# Patient Record
Sex: FEMALE | Race: AMERICAN INDIAN OR ALASKA NATIVE
[De-identification: names, ages, dates, MRNs, and addresses within clinical notes are randomized per-mention and may not be internally consistent; named-entity substitution may affect disease eponyms.]

---

## 2020-07-05 ENCOUNTER — HOSPITAL ENCOUNTER (INPATIENT)
Dept: HOSPITAL 5 - LD | Age: 25
LOS: 2 days | Discharge: HOME | End: 2020-07-07
Attending: OBSTETRICS & GYNECOLOGY | Admitting: OBSTETRICS & GYNECOLOGY
Payer: MEDICAID

## 2020-07-05 DIAGNOSIS — Z3A.39: ICD-10-CM

## 2020-07-05 DIAGNOSIS — Z23: ICD-10-CM

## 2020-07-05 LAB
HCT VFR BLD CALC: 32.7 % (ref 30.3–42.9)
HGB BLD-MCNC: 10.8 GM/DL (ref 10.1–14.3)
MCHC RBC AUTO-ENTMCNC: 33 % (ref 30–34)
MCV RBC AUTO: 87 FL (ref 79–97)
PLATELET # BLD: 165 K/MM3 (ref 140–440)
RBC # BLD AUTO: 3.79 M/MM3 (ref 3.65–5.03)

## 2020-07-05 PROCEDURE — 86592 SYPHILIS TEST NON-TREP QUAL: CPT

## 2020-07-05 PROCEDURE — 86706 HEP B SURFACE ANTIBODY: CPT

## 2020-07-05 PROCEDURE — 85018 HEMOGLOBIN: CPT

## 2020-07-05 PROCEDURE — 36415 COLL VENOUS BLD VENIPUNCTURE: CPT

## 2020-07-05 PROCEDURE — 85027 COMPLETE CBC AUTOMATED: CPT

## 2020-07-05 PROCEDURE — 86762 RUBELLA ANTIBODY: CPT

## 2020-07-05 PROCEDURE — 86901 BLOOD TYPING SEROLOGIC RH(D): CPT

## 2020-07-05 PROCEDURE — 86900 BLOOD TYPING SEROLOGIC ABO: CPT

## 2020-07-05 PROCEDURE — 86850 RBC ANTIBODY SCREEN: CPT

## 2020-07-05 PROCEDURE — 86803 HEPATITIS C AB TEST: CPT

## 2020-07-05 PROCEDURE — 85014 HEMATOCRIT: CPT

## 2020-07-05 RX ADMIN — DOCUSATE SODIUM SCH MG: 100 CAPSULE, LIQUID FILLED ORAL at 23:15

## 2020-07-05 RX ADMIN — IBUPROFEN SCH MG: 800 TABLET, FILM COATED ORAL at 19:34

## 2020-07-05 RX ADMIN — IBUPROFEN SCH MG: 800 TABLET, FILM COATED ORAL at 10:57

## 2020-07-05 RX ADMIN — DOCUSATE SODIUM SCH MG: 100 CAPSULE, LIQUID FILLED ORAL at 13:36

## 2020-07-05 NOTE — PROCEDURE NOTE
OB Delivery Note





- Delivery


Date of Delivery: 20 (Home delivery @ 0820 of female infant)


Assistant: INGRIS CHAPPELL


Estimated blood loss: 100cc





- Vaginal


Delivery presentation: vertex


Delivery position: OA


Intrapartum events: precipitous labor- <3hr


Delivery induction: none


Delivery monitor: none


Delivery cord: nuchal cord (X2 easily reduced by ), 3 umbilical vessels


Delivery laceration: 1st degree (Hemostatic. No bleeding noted. No repair 

needed. )


Anesthesia: none


Delivery comments: 


Pt was a  of viable female infant in the ambulance while on way to hospital.

Per EMS, pt delivered @ 0820 and placenta delivered @ 0835. Upon arrival to L&D,

vaginal and perineum inspect. First degree laceration noted. Homeostatic, not 

bleeding, no repair needed. Fundus firm, minimal bleeding. Apgars unknown. 

Weight 7-2. EBL 100ml. Mother and infant in stable condition in care of SHANT 

nurse and labor and delivery RN.

## 2020-07-05 NOTE — HISTORY AND PHYSICAL REPORT
History of Present Illness


Date of examination: 20 (Pt delivered in ambulance on way to hospital)


Date of admission: 


20 09:26





Chief complaint: 


Home delivery





History of present illness: 











EDC Confirmation:  2020


Gestational Age:  7 5/7 weeks





Past Pregnancy History 


   :      2


   Term Births:      1


   Premature Births:   0


   Living Children:   1


   Para:         1


   Mult. Births:      0


   Prev :   0


   Aborta:      0


   Elect. Ab:      0


   Spont. Ab:      0


   Ectopics:      0





Pregnancy # 1


   Delivery date:     2015


   Weeks Gestation:   40


   Delivery type:     Vaginal


   Anesthesia type:     epidural


   Delivery location:     [facility]


   Infant Sex:      male


   Birth weight:      8


   Comments:      none








Past Medical History:


   Reviewed history from 2015 and no changes required:


      none





Past Surgical History:


   Reviewed history from 2015 and no changes required:


      none


Family History Summary: 


Mother (biol.) - Has Family History Breast Cancer - Entered On: 12/10/2019








Social History:


   Patient is single


   SARAH Davis 


   


   Smoking History:


   Patient has never smoked.








Risk Factors: 





Smoked Tobacco Use:  Never smoker


Smokeless Tobacco Use:  Never


Passive smoke exposure:  no


Drug use:  no


HIV high-risk behavior:  low risk


Caffeine use:  <1 drinks per day


Exercise:  yes


   Type of Exercise:  walking


Seatbelt use:  100 %





Dietary Counseling: pn yes








Past Medical History 





Social Hx: Patient is single


SARAH Davis 





Smoking History:


Patient has never smoked.








Infection History 


Hx of STD: gonorrhea


HIV Risk Eval: low risk


Hepatitis B Risk Eval: low risk


Personal hx. of genital herpes: no


Partner hx. of genital herpes: no


Rash, Viral, or Febrile illness since last LMP? no


Varicella/Chicken Pox Status: Immunized


TB Risk: no





Genetic History 


 Congenital Heart Defect:


    Mom: no  Dad: no


Canavan Disease:


    Mom: no  Dad: no


Thalassemia


    Mom: no  Dad: no


Neural Tube Defect


    Mom: no  Dad: no


Down's Syndrome


    Mom: no  Dad: no


Salomon-Sachs


    Mom: no  Dad: no


Sickle Cell Disease/Trait


    Mom: no  Dad: no


Hemophilia


    Mom: no  Dad: no


Muscular Dystrophy


    Mom: no  Dad: no


Cystic Fibrosis


    Mom: no  Dad: no


[place] Chorea


    Mom: no  Dad: no


Mental Retardation


    Mom: no  Dad: no


Fragile X


    Mom: no  Dad: no


Other Genetic/Chromosomal Disorder


    Mom: no  Dad: no


Child w/other birth defect


    Mom: no  Dad: no





Enviromental Exposures 


Xray Exposure: no


Medication, drug, or alcohol use since LMP: no


Chemical/Other Exposure: no


Exposure to Cat Liter: no


Hx of Parvovirus (Fifth Disease): no


Active Medications (reviewed today):


None





Current Allergies:


No known allergies





Past History


Past Medical History: no pertinent history


Past Surgical History: no surgical history


Family/Genetic History: none


Social history: no significant social history





- Obstetrical History


Expected Date of Delivery: 20


Actual Gestation: 39 Week(s) 3 Day(s) 


: 3


Para: 2


Hx # Term Pregnancies: 2


Number of  Pregnancies: 0


Spontaneous Abortions: 0


Induced : 0


Number of Living Children: 2





Medications and Allergies


                                    Allergies











Allergy/AdvReac Type Severity Reaction Status Date / Time


 


No Known Allergies Allergy   Unverified 20 10:09











                                Home Medications











 Medication  Instructions  Recorded  Confirmed  Last Taken  Type


 


No Known Home Medications [No  20 Unknown History





Reported Home Medications]     











Active Meds: 


Active Medications





Acetaminophen (Tylenol)  1,000 mg PO Q6H PRN


   PRN Reason: Pain MILD(1-3)/Fever >100.5/HA


Bisacodyl (Dulcolax)  10 mg IL BID PRN


   PRN Reason: Constipation


Diphenhydramine HCl (Benadryl)  25 mg PO Q6H PRN


   PRN Reason: Itching


Diphtheria/Tetanus/Acell Pertussis (Adacel)  0.5 ml IM .ONCE ONE


   Stop: 20 10:09


Ephedrine Sulfate (Ephedrine Sulfate)  10 mg IV Q2M PRN


   PRN Reason: Hypotension


Fentanyl (Sublimaze)  100 mcg IV Q2H PRN


   PRN Reason: Pain,Severe (7-10) LABOR PAIN


Oxytocin/Sodium Chloride (Pitocin/Ns 20 Unit/1000ml Drip)  20 units in 1,000 mls

@ 125 mls/hr IV AS DIRECT ESME


Lactated Ringer's (Lactated Ringers)  1,000 mls @ 125 mls/hr IV AS DIRECT ESME


Oxytocin/Sodium Chloride (Pitocin/Ns 20 Unit/1000ml Drip)  20 units in 1,000 mls

@ 250 mls/hr IV AS DIRECT ESME


Ibuprofen (Ibuprofen)  800 mg PO Q7H ESME


Magnesium Hydroxide (Milk Of Magnesia)  30 ml PO HS PRN


   PRN Reason: Constipation


Mineral Oil (Mineral Oil)  30 ml PO QHS PRN


   PRN Reason: Constipation


Multi-Ingredient Ointment (Lansinoh)  1 applic TP PRN PRN


   PRN Reason: Sore Nipples


Nalbuphine HCl (Nalbuphine)  10 mg IV Q2H PRN


   PRN Reason: Pain, Moderate (4-6)


Naloxone HCl (Naloxone)  0.1 mg IV Q2MIN PRN


   PRN Reason: Res Rate </= 8 or 02 SAT < 92%


Ondansetron HCl (Zofran)  4 mg IV Q8H PRN


   PRN Reason: Nausea And Vomiting


Ondansetron HCl (Zofran)  4 mg IV Q8H PRN


   PRN Reason: Nausea And Vomiting


Promethazine HCl (Phenergan)  25 mg PO Q6H PRN


   PRN Reason: Nausea And Vomiting


Promethazine HCl (Phenergan)  25 mg IL Q6H PRN


   PRN Reason: Nausea And Vomiting


Promethazine HCl (Phenergan)  25 mg PO Q6H PRN


   PRN Reason: Nausea And Vomiting


Sodium Chloride (Sodium Chloride Flush Syringe 10 Ml)  10 ml IV PRN NR


Terbutaline Sulfate (Brethine)  0.25 mg SUB-Q ONCE PRN


   PRN Reason: Hyperstimulation/Hypertonicity


Terbutaline Sulfate (Brethine)  0.25 mg IVP ONCE PRN


   PRN Reason: Hyperstimulation/Hypertonicity


Witch Hazel/Glycerin (Tucks Pad)  1 each TP PRN PRN


   PRN Reason: Hemorrhoid/cleansing/soothing











Review of Systems


All systems: negative





- Vital Signs


Vital signs: 


                                   Vital Signs











Pulse BP


 


 85   125/71 


 


 20 09:56  20 09:56








                                        











Temp Pulse Resp BP Pulse Ox


 


    85      125/71    


 


    20 09:56     20 09:56   














- Physical Exam


Breasts: Positive: deferred


Cardiovascular: Regular rate


Lungs: Positive: Normal air movement


Abdomen: Positive: normal appearance, soft


Genitourinary (Female): Positive: normal external genitalia, normal perenium


Vulva: both: normal


Vagina: Positive: normal moisture


Cervix: Negative: lesion, discharge


Uterus: Positive: normal size, normal contour


Anus/Rectum: Positive: normal perianal skin


Extremities: Positive: normal


Deep Tendon Reflex Grade: Normal +2





Results


Result Diagrams: 


                                 20 10:36





All other labs normal.











GBS POSITIVE





Hep B,C, Syphilis, Rubella drawn on admission





Assessment and Plan


Pt was 25 y.o. home delivery @ 39.6 wks. Mother and infant in stable condition. 

GBS positive: no treatment. Admit to labor and delivery for post delivery 

recovery and then transfer to mother baby unit when recovery completed.

## 2020-07-06 LAB
HCT VFR BLD CALC: 28.6 % (ref 30.3–42.9)
HGB BLD-MCNC: 9.7 GM/DL (ref 10.1–14.3)

## 2020-07-06 PROCEDURE — 3E0234Z INTRODUCTION OF SERUM, TOXOID AND VACCINE INTO MUSCLE, PERCUTANEOUS APPROACH: ICD-10-PCS | Performed by: ADVANCED PRACTICE MIDWIFE

## 2020-07-06 RX ADMIN — IBUPROFEN SCH: 800 TABLET, FILM COATED ORAL at 15:21

## 2020-07-06 RX ADMIN — IBUPROFEN SCH MG: 800 TABLET, FILM COATED ORAL at 09:48

## 2020-07-06 RX ADMIN — DOCUSATE SODIUM SCH MG: 100 CAPSULE, LIQUID FILLED ORAL at 21:47

## 2020-07-06 RX ADMIN — IBUPROFEN SCH MG: 800 TABLET, FILM COATED ORAL at 03:35

## 2020-07-06 RX ADMIN — IBUPROFEN SCH MG: 800 TABLET, FILM COATED ORAL at 21:47

## 2020-07-06 RX ADMIN — DOCUSATE SODIUM SCH MG: 100 CAPSULE, LIQUID FILLED ORAL at 09:49

## 2020-07-06 NOTE — DISCHARGE SUMMARY
Providers





- Providers


Date of Admission: 


20 09:26





Date of discharge: 20 (pt desires d/c)


Attending physician: 


CHAVO SMITH





Primary care physician: 


CHAVO SMITH








Hospitalization


Reason for admission: active labor


Delivery: 


Episiotomy: none


Laceration: none


Incision: normal


Other postpartum procedures: none


Postpartum complications: none


Discharge diagnosis: IUP at term delivered


 baby: female


Hospital course: 


uncomplicated vaginal delivery





Pt in good spirits States she is feeling great. VSS FF below umb Lochia small 

Perineum intact. H&H  drop r/t blood loss from delivery Asymptomatic Doing 

well s/p vag del. P: d/c today with instructions RTO 4 weeks PP care





Condition at discharge: Good


Disposition: DC-01 TO HOME OR SELFCARE





- Discharge Diagnoses


(1) Spontaneous vaginal delivery


Status: Acute   Comment: RTO 4 weeks PP care   





Plan





- Provider Discharge Summary


Activity: routine, no sex for 6 weeks, no heavy lifting 4 weeks, no strenuous 

exercise


Diet: routine


Instructions: routine


Additional instructions: 


[]  Smoking cessation referral if applicable(refer to patient education folder 

for contact #)


[]  Refer to [facility]'s [facility] Booklet








Call your doctor immediately for:


* Fever > 100.5


* Heavy vaginal bleeding ( >1 pad per hour)


* Severe persistent headache


* Shortness of breath


* Reddened, hot, painful area to leg or breast


* Drainage or odor from incision.





* Keep incision clean and dry at all times and follow doctor's instructions 

regarding bathing/showering











- Follow up plan


Follow up: 


CHAVO SMITH MD [Primary Care Provider] - 20


(Congratulations!


Call [phone] to schedule your postpartum visit in 4 weeks.


Take Motrin/ibuprofen for cramping/pain.


Continue to take your prenatal vitamins.


Call with any concerns.)

## 2020-07-07 VITALS — DIASTOLIC BLOOD PRESSURE: 55 MMHG | SYSTOLIC BLOOD PRESSURE: 105 MMHG
